# Patient Record
Sex: FEMALE | Race: WHITE | NOT HISPANIC OR LATINO | Employment: PART TIME | URBAN - METROPOLITAN AREA
[De-identification: names, ages, dates, MRNs, and addresses within clinical notes are randomized per-mention and may not be internally consistent; named-entity substitution may affect disease eponyms.]

---

## 2021-01-27 ENCOUNTER — HISTORICAL (OUTPATIENT)
Dept: ADMINISTRATIVE | Facility: HOSPITAL | Age: 31
End: 2021-01-27

## 2021-01-27 LAB — CORTIS SERPL-SCNC: 16.7 UG/DL

## 2022-04-10 ENCOUNTER — HISTORICAL (OUTPATIENT)
Dept: ADMINISTRATIVE | Facility: HOSPITAL | Age: 32
End: 2022-04-10
Payer: COMMERCIAL

## 2022-04-25 VITALS
BODY MASS INDEX: 23.43 KG/M2 | DIASTOLIC BLOOD PRESSURE: 82 MMHG | WEIGHT: 140.63 LBS | HEIGHT: 65 IN | SYSTOLIC BLOOD PRESSURE: 117 MMHG

## 2022-06-21 LAB
C TRACH RRNA SPEC QL PROBE: NEGATIVE
HBV SURFACE AG SERPL QL IA: NEGATIVE
HCV AB SERPL QL IA: NEGATIVE
HIV 1+2 AB+HIV1 P24 AG SERPL QL IA: NEGATIVE
N GONORRHOEAE, AMPLIFIED DNA: NEGATIVE
RUBELLA IMMUNE STATUS: NORMAL
T PALLIDUM AB SER QL: NONREACTIVE

## 2022-11-22 LAB — STREP B PCR (OHS): NEGATIVE

## 2022-12-12 ENCOUNTER — ANESTHESIA (OUTPATIENT)
Dept: OBSTETRICS AND GYNECOLOGY | Facility: HOSPITAL | Age: 32
End: 2022-12-12
Payer: COMMERCIAL

## 2022-12-12 ENCOUNTER — ANESTHESIA EVENT (OUTPATIENT)
Dept: OBSTETRICS AND GYNECOLOGY | Facility: HOSPITAL | Age: 32
End: 2022-12-12
Payer: COMMERCIAL

## 2022-12-12 ENCOUNTER — HOSPITAL ENCOUNTER (INPATIENT)
Facility: HOSPITAL | Age: 32
LOS: 2 days | Discharge: HOME | End: 2022-12-14
Attending: OBSTETRICS & GYNECOLOGY | Admitting: OBSTETRICS & GYNECOLOGY
Payer: COMMERCIAL

## 2022-12-12 DIAGNOSIS — Z3A.39 39 WEEKS GESTATION OF PREGNANCY: ICD-10-CM

## 2022-12-12 DIAGNOSIS — O61.9 FAILED INDUCTION OF LABOR: ICD-10-CM

## 2022-12-12 DIAGNOSIS — Z34.90 ENCOUNTER FOR ELECTIVE INDUCTION OF LABOR: Primary | ICD-10-CM

## 2022-12-12 LAB
BASOPHILS # BLD AUTO: 0.08 X10(3)/MCL (ref 0–0.2)
BASOPHILS NFR BLD AUTO: 0.5 %
EOSINOPHIL # BLD AUTO: 0.13 X10(3)/MCL (ref 0–0.9)
EOSINOPHIL NFR BLD AUTO: 0.9 %
ERYTHROCYTE [DISTWIDTH] IN BLOOD BY AUTOMATED COUNT: 13.5 % (ref 11.5–17)
GROUP & RH: NORMAL
HCT VFR BLD AUTO: 36.4 % (ref 37–47)
HGB BLD-MCNC: 11.9 GM/DL (ref 12–16)
IMM GRANULOCYTES # BLD AUTO: 0.35 X10(3)/MCL (ref 0–0.04)
IMM GRANULOCYTES NFR BLD AUTO: 2.3 %
INDIRECT COOMBS GEL: NORMAL
LYMPHOCYTES # BLD AUTO: 2.07 X10(3)/MCL (ref 0.6–4.6)
LYMPHOCYTES NFR BLD AUTO: 13.8 %
MCH RBC QN AUTO: 29.4 PG (ref 27–31)
MCHC RBC AUTO-ENTMCNC: 32.7 MG/DL (ref 33–36)
MCV RBC AUTO: 89.9 FL (ref 80–94)
MONOCYTES # BLD AUTO: 1.11 X10(3)/MCL (ref 0.1–1.3)
MONOCYTES NFR BLD AUTO: 7.4 %
NEUTROPHILS # BLD AUTO: 11.3 X10(3)/MCL (ref 2.1–9.2)
NEUTROPHILS NFR BLD AUTO: 75.1 %
NRBC BLD AUTO-RTO: 0 %
PLATELET # BLD AUTO: 250 X10(3)/MCL (ref 130–400)
PMV BLD AUTO: 11.2 FL (ref 7.4–10.4)
RBC # BLD AUTO: 4.05 X10(6)/MCL (ref 4.2–5.4)
T PALLIDUM AB SER QL: NONREACTIVE
WBC # SPEC AUTO: 15 X10(3)/MCL (ref 4.5–11.5)

## 2022-12-12 PROCEDURE — 25000003 PHARM REV CODE 250: Performed by: ANESTHESIOLOGY

## 2022-12-12 PROCEDURE — 37000009 HC ANESTHESIA EA ADD 15 MINS: Performed by: OBSTETRICS & GYNECOLOGY

## 2022-12-12 PROCEDURE — 85025 COMPLETE CBC W/AUTO DIFF WBC: CPT | Performed by: OBSTETRICS & GYNECOLOGY

## 2022-12-12 PROCEDURE — 37000008 HC ANESTHESIA 1ST 15 MINUTES: Performed by: OBSTETRICS & GYNECOLOGY

## 2022-12-12 PROCEDURE — 86901 BLOOD TYPING SEROLOGIC RH(D): CPT | Performed by: OBSTETRICS & GYNECOLOGY

## 2022-12-12 PROCEDURE — 27200918 HC ALEXIS O RING

## 2022-12-12 PROCEDURE — 72100002 HC LABOR CARE, 1ST 8 HOURS

## 2022-12-12 PROCEDURE — 72100003 HC LABOR CARE, EA. ADDL. 8 HRS

## 2022-12-12 PROCEDURE — 63600175 PHARM REV CODE 636 W HCPCS: Performed by: OBSTETRICS & GYNECOLOGY

## 2022-12-12 PROCEDURE — 63600175 PHARM REV CODE 636 W HCPCS: Performed by: NURSE ANESTHETIST, CERTIFIED REGISTERED

## 2022-12-12 PROCEDURE — 11000001 HC ACUTE MED/SURG PRIVATE ROOM

## 2022-12-12 PROCEDURE — 51702 INSERT TEMP BLADDER CATH: CPT

## 2022-12-12 PROCEDURE — 25000003 PHARM REV CODE 250: Performed by: NURSE ANESTHETIST, CERTIFIED REGISTERED

## 2022-12-12 PROCEDURE — 36004725 HC OB OR TIME LEV III - EA ADD 15 MIN: Performed by: OBSTETRICS & GYNECOLOGY

## 2022-12-12 PROCEDURE — 71000033 HC RECOVERY, INTIAL HOUR: Performed by: OBSTETRICS & GYNECOLOGY

## 2022-12-12 PROCEDURE — 36004724 HC OB OR TIME LEV III - 1ST 15 MIN: Performed by: OBSTETRICS & GYNECOLOGY

## 2022-12-12 PROCEDURE — 86780 TREPONEMA PALLIDUM: CPT | Performed by: OBSTETRICS & GYNECOLOGY

## 2022-12-12 PROCEDURE — 25000003 PHARM REV CODE 250: Performed by: OBSTETRICS & GYNECOLOGY

## 2022-12-12 PROCEDURE — 27201423 OPTIME MED/SURG SUP & DEVICES STERILE SUPPLY: Performed by: OBSTETRICS & GYNECOLOGY

## 2022-12-12 RX ORDER — MISOPROSTOL 100 MCG
25 TABLET ORAL ONCE
Status: COMPLETED | OUTPATIENT
Start: 2022-12-12 | End: 2022-12-12

## 2022-12-12 RX ORDER — BUTORPHANOL TARTRATE 2 MG/ML
1 INJECTION INTRAMUSCULAR; INTRAVENOUS
Status: DISCONTINUED | OUTPATIENT
Start: 2022-12-12 | End: 2022-12-14 | Stop reason: HOSPADM

## 2022-12-12 RX ORDER — LEVOTHYROXINE SODIUM 25 UG/1
25 TABLET ORAL
COMMUNITY
Start: 2022-11-12

## 2022-12-12 RX ORDER — TERBUTALINE SULFATE 1 MG/ML
0.25 INJECTION SUBCUTANEOUS
Status: DISCONTINUED | OUTPATIENT
Start: 2022-12-12 | End: 2022-12-14 | Stop reason: HOSPADM

## 2022-12-12 RX ORDER — CEFAZOLIN SODIUM 1 G/3ML
INJECTION, POWDER, FOR SOLUTION INTRAMUSCULAR; INTRAVENOUS
Status: DISCONTINUED | OUTPATIENT
Start: 2022-12-12 | End: 2022-12-12

## 2022-12-12 RX ORDER — OXYTOCIN/RINGER'S LACTATE 30/500 ML
334 PLASTIC BAG, INJECTION (ML) INTRAVENOUS ONCE
Status: DISCONTINUED | OUTPATIENT
Start: 2022-12-12 | End: 2022-12-14 | Stop reason: HOSPADM

## 2022-12-12 RX ORDER — METHYLERGONOVINE MALEATE 0.2 MG/ML
200 INJECTION INTRAVENOUS
Status: CANCELLED | OUTPATIENT
Start: 2022-12-12

## 2022-12-12 RX ORDER — OXYCODONE AND ACETAMINOPHEN 10; 325 MG/1; MG/1
1 TABLET ORAL EVERY 4 HOURS PRN
Status: DISCONTINUED | OUTPATIENT
Start: 2022-12-12 | End: 2022-12-14 | Stop reason: HOSPADM

## 2022-12-12 RX ORDER — MUPIROCIN 20 MG/G
OINTMENT TOPICAL 2 TIMES DAILY
Status: DISCONTINUED | OUTPATIENT
Start: 2022-12-12 | End: 2022-12-14 | Stop reason: HOSPADM

## 2022-12-12 RX ORDER — ONDANSETRON 2 MG/ML
INJECTION INTRAMUSCULAR; INTRAVENOUS
Status: DISCONTINUED | OUTPATIENT
Start: 2022-12-12 | End: 2022-12-12

## 2022-12-12 RX ORDER — DIPHENHYDRAMINE HCL 25 MG
25 CAPSULE ORAL EVERY 4 HOURS PRN
Status: DISCONTINUED | OUTPATIENT
Start: 2022-12-12 | End: 2022-12-14 | Stop reason: HOSPADM

## 2022-12-12 RX ORDER — SIMETHICONE 80 MG
1 TABLET,CHEWABLE ORAL EVERY 6 HOURS PRN
Status: DISCONTINUED | OUTPATIENT
Start: 2022-12-12 | End: 2022-12-14 | Stop reason: HOSPADM

## 2022-12-12 RX ORDER — ACETAMINOPHEN 325 MG/1
650 TABLET ORAL EVERY 6 HOURS PRN
Status: DISCONTINUED | OUTPATIENT
Start: 2022-12-12 | End: 2022-12-14 | Stop reason: HOSPADM

## 2022-12-12 RX ORDER — OXYTOCIN/RINGER'S LACTATE 30/500 ML
95 PLASTIC BAG, INJECTION (ML) INTRAVENOUS ONCE
Status: DISCONTINUED | OUTPATIENT
Start: 2022-12-12 | End: 2022-12-14 | Stop reason: HOSPADM

## 2022-12-12 RX ORDER — BUTORPHANOL TARTRATE 2 MG/ML
2 INJECTION INTRAMUSCULAR; INTRAVENOUS
Status: CANCELLED | OUTPATIENT
Start: 2022-12-12

## 2022-12-12 RX ORDER — OXYTOCIN/RINGER'S LACTATE 30/500 ML
0-30 PLASTIC BAG, INJECTION (ML) INTRAVENOUS CONTINUOUS
Status: DISCONTINUED | OUTPATIENT
Start: 2022-12-12 | End: 2022-12-14 | Stop reason: HOSPADM

## 2022-12-12 RX ORDER — KETOROLAC TROMETHAMINE 30 MG/ML
30 INJECTION, SOLUTION INTRAMUSCULAR; INTRAVENOUS EVERY 8 HOURS
Status: COMPLETED | OUTPATIENT
Start: 2022-12-12 | End: 2022-12-13

## 2022-12-12 RX ORDER — DEXTROSE, SODIUM CHLORIDE, SODIUM LACTATE, POTASSIUM CHLORIDE, AND CALCIUM CHLORIDE 5; .6; .31; .03; .02 G/100ML; G/100ML; G/100ML; G/100ML; G/100ML
INJECTION, SOLUTION INTRAVENOUS CONTINUOUS
Status: DISCONTINUED | OUTPATIENT
Start: 2022-12-12 | End: 2022-12-14 | Stop reason: HOSPADM

## 2022-12-12 RX ORDER — LIDOCAINE HYDROCHLORIDE 10 MG/ML
10 INJECTION INFILTRATION; PERINEURAL ONCE AS NEEDED
Status: DISCONTINUED | OUTPATIENT
Start: 2022-12-12 | End: 2022-12-14 | Stop reason: HOSPADM

## 2022-12-12 RX ORDER — SIMETHICONE 80 MG
1 TABLET,CHEWABLE ORAL 4 TIMES DAILY PRN
Status: DISCONTINUED | OUTPATIENT
Start: 2022-12-12 | End: 2022-12-12

## 2022-12-12 RX ORDER — ADHESIVE BANDAGE
30 BANDAGE TOPICAL 2 TIMES DAILY PRN
Status: DISCONTINUED | OUTPATIENT
Start: 2022-12-13 | End: 2022-12-14 | Stop reason: HOSPADM

## 2022-12-12 RX ORDER — ONDANSETRON 4 MG/1
8 TABLET, ORALLY DISINTEGRATING ORAL EVERY 8 HOURS PRN
Status: DISCONTINUED | OUTPATIENT
Start: 2022-12-12 | End: 2022-12-14 | Stop reason: HOSPADM

## 2022-12-12 RX ORDER — ACETAMINOPHEN 10 MG/ML
INJECTION, SOLUTION INTRAVENOUS
Status: DISCONTINUED | OUTPATIENT
Start: 2022-12-12 | End: 2022-12-12

## 2022-12-12 RX ORDER — OXYTOCIN 10 [USP'U]/ML
INJECTION, SOLUTION INTRAMUSCULAR; INTRAVENOUS
Status: DISCONTINUED | OUTPATIENT
Start: 2022-12-12 | End: 2022-12-12

## 2022-12-12 RX ORDER — MUPIROCIN 20 MG/G
OINTMENT TOPICAL 2 TIMES DAILY
Status: DISCONTINUED | OUTPATIENT
Start: 2022-12-12 | End: 2022-12-12

## 2022-12-12 RX ORDER — CARBOPROST TROMETHAMINE 250 UG/ML
250 INJECTION, SOLUTION INTRAMUSCULAR
Status: CANCELLED | OUTPATIENT
Start: 2022-12-12

## 2022-12-12 RX ORDER — MORPHINE SULFATE 0.5 MG/ML
INJECTION, SOLUTION EPIDURAL; INTRATHECAL; INTRAVENOUS
Status: DISCONTINUED | OUTPATIENT
Start: 2022-12-12 | End: 2022-12-12

## 2022-12-12 RX ORDER — FENTANYL CITRATE 50 UG/ML
INJECTION, SOLUTION INTRAMUSCULAR; INTRAVENOUS
Status: DISCONTINUED | OUTPATIENT
Start: 2022-12-12 | End: 2022-12-12

## 2022-12-12 RX ORDER — SODIUM CHLORIDE, SODIUM LACTATE, POTASSIUM CHLORIDE, CALCIUM CHLORIDE 600; 310; 30; 20 MG/100ML; MG/100ML; MG/100ML; MG/100ML
INJECTION, SOLUTION INTRAVENOUS CONTINUOUS
Status: DISCONTINUED | OUTPATIENT
Start: 2022-12-12 | End: 2022-12-14 | Stop reason: HOSPADM

## 2022-12-12 RX ORDER — PROCHLORPERAZINE EDISYLATE 5 MG/ML
5 INJECTION INTRAMUSCULAR; INTRAVENOUS EVERY 6 HOURS PRN
Status: DISCONTINUED | OUTPATIENT
Start: 2022-12-12 | End: 2022-12-14 | Stop reason: HOSPADM

## 2022-12-12 RX ORDER — CALCIUM CARBONATE 200(500)MG
500 TABLET,CHEWABLE ORAL 3 TIMES DAILY PRN
Status: DISCONTINUED | OUTPATIENT
Start: 2022-12-12 | End: 2022-12-14 | Stop reason: HOSPADM

## 2022-12-12 RX ORDER — AMOXICILLIN 250 MG
1 CAPSULE ORAL NIGHTLY PRN
Status: DISCONTINUED | OUTPATIENT
Start: 2022-12-12 | End: 2022-12-14 | Stop reason: HOSPADM

## 2022-12-12 RX ORDER — IBUPROFEN 800 MG/1
800 TABLET ORAL EVERY 8 HOURS
Status: DISCONTINUED | OUTPATIENT
Start: 2022-12-13 | End: 2022-12-14 | Stop reason: HOSPADM

## 2022-12-12 RX ORDER — BISACODYL 10 MG
10 SUPPOSITORY, RECTAL RECTAL ONCE AS NEEDED
Status: DISCONTINUED | OUTPATIENT
Start: 2022-12-12 | End: 2022-12-14 | Stop reason: HOSPADM

## 2022-12-12 RX ORDER — ONDANSETRON 2 MG/ML
4 INJECTION INTRAMUSCULAR; INTRAVENOUS EVERY 6 HOURS PRN
Status: DISCONTINUED | OUTPATIENT
Start: 2022-12-12 | End: 2022-12-14 | Stop reason: HOSPADM

## 2022-12-12 RX ORDER — OXYCODONE AND ACETAMINOPHEN 5; 325 MG/1; MG/1
1 TABLET ORAL EVERY 4 HOURS PRN
Status: DISCONTINUED | OUTPATIENT
Start: 2022-12-12 | End: 2022-12-14 | Stop reason: HOSPADM

## 2022-12-12 RX ORDER — OXYTOCIN 10 [USP'U]/ML
10 INJECTION, SOLUTION INTRAMUSCULAR; INTRAVENOUS
Status: DISCONTINUED | OUTPATIENT
Start: 2022-12-12 | End: 2022-12-14 | Stop reason: HOSPADM

## 2022-12-12 RX ORDER — DIPHENOXYLATE HYDROCHLORIDE AND ATROPINE SULFATE 2.5; .025 MG/1; MG/1
1 TABLET ORAL 4 TIMES DAILY PRN
Status: CANCELLED | OUTPATIENT
Start: 2022-12-12

## 2022-12-12 RX ORDER — BUPIVACAINE HYDROCHLORIDE 7.5 MG/ML
INJECTION, SOLUTION EPIDURAL; RETROBULBAR
Status: COMPLETED | OUTPATIENT
Start: 2022-12-12 | End: 2022-12-12

## 2022-12-12 RX ORDER — SODIUM CHLORIDE 0.9 % (FLUSH) 0.9 %
10 SYRINGE (ML) INJECTION
Status: DISCONTINUED | OUTPATIENT
Start: 2022-12-12 | End: 2022-12-14 | Stop reason: HOSPADM

## 2022-12-12 RX ORDER — MISOPROSTOL 100 UG/1
800 TABLET ORAL
Status: CANCELLED | OUTPATIENT
Start: 2022-12-12

## 2022-12-12 RX ORDER — KETOROLAC TROMETHAMINE 30 MG/ML
INJECTION, SOLUTION INTRAMUSCULAR; INTRAVENOUS
Status: DISCONTINUED | OUTPATIENT
Start: 2022-12-12 | End: 2022-12-12

## 2022-12-12 RX ORDER — PRENATAL WITH FERROUS FUM AND FOLIC ACID 3080; 920; 120; 400; 22; 1.84; 3; 20; 10; 1; 12; 200; 27; 25; 2 [IU]/1; [IU]/1; MG/1; [IU]/1; MG/1; MG/1; MG/1; MG/1; MG/1; MG/1; UG/1; MG/1; MG/1; MG/1; MG/1
1 TABLET ORAL DAILY
Status: DISCONTINUED | OUTPATIENT
Start: 2022-12-12 | End: 2022-12-14 | Stop reason: HOSPADM

## 2022-12-12 RX ORDER — DOCUSATE SODIUM 100 MG/1
200 CAPSULE, LIQUID FILLED ORAL 2 TIMES DAILY
Status: DISCONTINUED | OUTPATIENT
Start: 2022-12-12 | End: 2022-12-14 | Stop reason: HOSPADM

## 2022-12-12 RX ADMIN — MISOPROSTOL 25 MCG: 100 TABLET ORAL at 01:12

## 2022-12-12 RX ADMIN — OXYTOCIN 30 UNITS: 10 INJECTION, SOLUTION INTRAMUSCULAR; INTRAVENOUS at 01:12

## 2022-12-12 RX ADMIN — SODIUM CHLORIDE, SODIUM LACTATE, POTASSIUM CHLORIDE, CALCIUM CHLORIDE AND DEXTROSE MONOHYDRATE: 5; 600; 310; 30; 20 INJECTION, SOLUTION INTRAVENOUS at 07:12

## 2022-12-12 RX ADMIN — KETOROLAC TROMETHAMINE 30 MG: 30 INJECTION, SOLUTION INTRAMUSCULAR at 01:12

## 2022-12-12 RX ADMIN — CEFAZOLIN 2 G: 330 INJECTION, POWDER, FOR SOLUTION INTRAMUSCULAR; INTRAVENOUS at 12:12

## 2022-12-12 RX ADMIN — BUPIVACAINE HYDROCHLORIDE 1.8 ML: 7.5 INJECTION, SOLUTION EPIDURAL; RETROBULBAR at 12:12

## 2022-12-12 RX ADMIN — ONDANSETRON 4 MG: 2 INJECTION INTRAMUSCULAR; INTRAVENOUS at 12:12

## 2022-12-12 RX ADMIN — KETOROLAC TROMETHAMINE 30 MG: 30 INJECTION, SOLUTION INTRAMUSCULAR; INTRAVENOUS at 10:12

## 2022-12-12 RX ADMIN — FENTANYL CITRATE 10 MCG: 50 INJECTION, SOLUTION INTRAMUSCULAR; INTRAVENOUS at 12:12

## 2022-12-12 RX ADMIN — SODIUM CHLORIDE, SODIUM GLUCONATE, SODIUM ACETATE, POTASSIUM CHLORIDE AND MAGNESIUM CHLORIDE: 526; 502; 368; 37; 30 INJECTION, SOLUTION INTRAVENOUS at 12:12

## 2022-12-12 RX ADMIN — SODIUM CHLORIDE, SODIUM LACTATE, POTASSIUM CHLORIDE, CALCIUM CHLORIDE AND DEXTROSE MONOHYDRATE: 5; 600; 310; 30; 20 INJECTION, SOLUTION INTRAVENOUS at 12:12

## 2022-12-12 RX ADMIN — MORPHINE SULFATE 0.15 MG: 0.5 INJECTION, SOLUTION EPIDURAL; INTRATHECAL; INTRAVENOUS at 12:12

## 2022-12-12 RX ADMIN — ACETAMINOPHEN 1000 MG: 10 INJECTION, SOLUTION INTRAVENOUS at 01:12

## 2022-12-12 RX ADMIN — SODIUM CHLORIDE, SODIUM LACTATE, POTASSIUM CHLORIDE, CALCIUM CHLORIDE AND DEXTROSE MONOHYDRATE: 5; 600; 310; 30; 20 INJECTION, SOLUTION INTRAVENOUS at 05:12

## 2022-12-12 NOTE — ANESTHESIA PREPROCEDURE EVALUATION
Latest Reference Range & Units 22 00:33   WBC 4.5 - 11.5 x10(3)/mcL 15.0 (H)   RBC 4.20 - 5.40 x10(6)/mcL 4.05 (L)   HEMOGLOBIN 12.0 - 16.0 gm/dL 11.9 (L)   HEMATOCRIT 37.0 - 47.0 % 36.4 (L)   MCV 80.0 - 94.0 fL 89.9   MCH 27.0 - 31.0 pg 29.4   MCHC 33.0 - 36.0 mg/dL 32.7 (L)   RDW 11.5 - 17.0 % 13.5   Platelets 130 - 400 x10(3)/mcL 250   MPV 7.4 - 10.4 fL 11.2 (H)   Neut % % 75.1   LYMPH % % 13.8   Mono % % 7.4   Eosinophil % % 0.9   Basophil % % 0.5   Immature Granulocytes % 2.3   Neut # 2.1 - 9.2 x10(3)/mcL 11.3 (H)   Lymph # 0.6 - 4.6 x10(3)/mcL 2.07   Mono # 0.1 - 1.3 x10(3)/mcL 1.11   Eos # 0 - 0.9 x10(3)/mcL 0.13   Baso # 0 - 0.2 x10(3)/mcL 0.08   Immature Grans (Abs) 0 - 0.04 x10(3)/mcL 0.35 (H)   nRBC % 0.0   Group & Rh  A NEG   Indirect Vitaly GEL  NEG   Syphilis Antibody Nonreactive, Equivocal  Nonreactive   (H): Data is abnormally high  (L): Data is abnormally low                                                                                                     2022  Carla Dutta is a 32 y.o., female.    Pre-op Assessment    I have reviewed the Patient Summary Reports.     I have reviewed the Nursing Notes. I have reviewed the NPO Status.   I have reviewed the Medications.     Review of Systems  Anesthesia Hx:  No problems with previous Anesthesia    Hematology/Oncology:  Hematology Normal   Oncology Normal     EENT/Dental:EENT/Dental Normal   Cardiovascular:  Cardiovascular Normal Exercise tolerance: good   Functional Capacity good / => 4 METS    Pulmonary:  Pulmonary Normal    Renal/:   Denies Chronic Renal Disease.     Hepatic/GI:  Hepatic/GI Normal    Musculoskeletal:  Musculoskeletal Normal    Neurological:  Neurology Normal    Endocrine:  Endocrine Normal  Denies Morbid Obesity / BMI > 40  Dermatological:  Skin Normal    Psych:  Psychiatric Normal           Physical Exam  General: Alert, Oriented, Well nourished and Cooperative    Airway:  Mallampati: II   Mouth  Opening: Normal  TM Distance: Normal  Tongue: Normal  Neck ROM: Normal ROM    Dental:  Intact    Chest/Lungs:  Clear to auscultation, Normal Respiratory Rate    Heart:  Rate: Normal  Rhythm: Regular Rhythm        Anesthesia Plan  Type of Anesthesia, risks & benefits discussed:    Anesthesia Type: Spinal  Intra-op Monitoring Plan: Standard ASA Monitors  Post Op Pain Control Plan: multimodal analgesia  Induction:  IV  Airway Plan: Direct  Informed Consent: Informed consent signed with the Patient and all parties understand the risks and agree with anesthesia plan.  All questions answered. Patient consented to blood products? Yes  ASA Score: 2  Day of Surgery Review of History & Physical: H&P Update referred to the surgeon/provider.    Ready For Surgery From Anesthesia Perspective.     .

## 2022-12-12 NOTE — ANESTHESIA PROCEDURE NOTES
Spinal    Diagnosis: pregnancy requiring c section  Patient location during procedure: OR  Start time: 12/12/2022 12:56 PM  Timeout: 12/12/2022 12:55 PM  End time: 12/12/2022 12:57 PM    Staffing  Authorizing Provider: Keaton Lockhart MD  Performing Provider: Jong Juarez CRNA    Spinal Block  Patient position: sitting  Prep: ChloraPrep  Patient monitoring: heart rate, cardiac monitor, continuous pulse ox and frequent blood pressure checks  Approach: midline  Location: L3-4  Injection technique: single shot  CSF Fluid: clear free-flowing CSF  Needle  Needle type: Mariah   Needle gauge: 25 G  Needle length: 3.5 in  Additional Documentation: no paresthesia on injection  Needle localization: anatomical landmarks  Assessment  Sensory level: T4   Dermatomal levels determined by alcohol wipe  Patient's tolerance of the procedure: comfortable throughout block  Additional Notes  Duramorph 100 mcg + fentanyl 10 mcg sas  Medications:    Medications: bupivacaine (pf) (MARCAINE) injection 0.75% - Intraspinal   1.8 mL - 12/12/2022 12:56:00 PM

## 2022-12-12 NOTE — PLAN OF CARE
Problem: Adult Inpatient Plan of Care  Goal: Plan of Care Review  Outcome: Ongoing, Progressing  Goal: Patient-Specific Goal (Individualized)  Outcome: Ongoing, Progressing  Goal: Absence of Hospital-Acquired Illness or Injury  Outcome: Ongoing, Progressing  Goal: Optimal Comfort and Wellbeing  Outcome: Ongoing, Progressing  Goal: Readiness for Transition of Care  Outcome: Ongoing, Progressing     Problem:  Fall Injury Risk  Goal: Absence of Fall, Infant Drop and Related Injury  Outcome: Ongoing, Progressing     Problem: Infection  Goal: Absence of Infection Signs and Symptoms  Outcome: Ongoing, Progressing     Problem: Postoperative Nausea and Vomiting (Postpartum  Delivery)  Goal: Nausea and Vomiting Relief  Outcome: Ongoing, Progressing     Problem: Pain (Postpartum  Delivery)  Goal: Acceptable Pain Control  Outcome: Ongoing, Progressing     Problem: Infection (Postpartum  Delivery)  Goal: Absence of Infection Signs and Symptoms  Outcome: Ongoing, Progressing

## 2022-12-12 NOTE — L&D DELIVERY NOTE
[facility] - 2nd Floor Mother/Baby Unit   Section   Operative Note    SUMMARY     Date of Procedure: 2022     Procedure: Procedure(s) (LRB):   SECTION (N/A)    Surgeon(s) and Role:     * Robin Mackenzie MD - Primary     * Jose Armando Sharpe MD - Assisting        Pre-Operative Diagnosis: Failed induction of labor [O61.9]    Post-Operative Diagnosis: Post-Op Diagnosis Codes:     * Failed induction of labor [O61.9]    Anesthesia: Spinal/Epidural    Technical Procedures Used: LTCS           Description of the Findings of the Procedure: none    Significant Surgical Tasks Conducted by the Assistant(s), if Applicable: assited with opening, delivery of baby, closure of peritoneum    Complications: No    Blood Loss: * No values recorded between 2022 12:00 AM and 2022  2:56 PM *     With patient in supine position, the legs are  and Worthington Catheter placed and positioning to supine done.   Abdomen prepped with Chloroprep and 3 minute drying time allowed prior to draping of the abdomen.   Time out taken with OR team members.  Pfannenstiel Incision made through the skin, transverse fascial incision developed, rectus muscles  in the midline and the peritoneum entered.   no adhesions noted.  The lower uterine segment and position of the fetus identified.   Bladder flap taken down through transverse peritoneal incision.    Low Transverse Incision made through well developer lower uterine segment and extended laterally with blunt dissection.   Clear fluid noted.  Infant delivered from vertex presentation.  Cord clamped after one minute and  handed to attending nurse.  Cord blood taken, placenta delivered.  The uterus wasnot exteriorized.  The edges of the uterine incision are grasped with Ng clamps at the angles and the inferior and superior midline edges of the incision.    Closure with running lock 0 Chromic, starting at each angle, tying in the  "midline.   Observation for bleeding with suture of any bleeding along the hysterotomy line.   With good hemostasis noted, the anterior pelvis is rinsed with sterile saline.   Right and left adnexa with normal anatomy.     Closure of the abdomen with 2 0 Vicryl running of the peritoneum, fascial closure with 0 looped PDS starting at the right angle and tying the knot at the left angle.  Skin closure with 4 0 Vicryl subcuticular.  Wound dressed with telfa/tegaderm.          Specimens:   Specimen (24h ago, onward)      None            Condition: Good    Disposition: PACU - hemodynamically stable.    Attestation: Good         Delivery Information for Narendra Dutta    Birth information:  YOB: 2021   Time of birth: 1:12 PM   Sex: male   Head Delivery Date/Time: 2022  1:12 PM   Delivery type: , Low Transverse   Gestational Age: 39w0d    Delivery Providers    Delivering clinician: Robin Garcia MD           Measurements    Weight: 3629 g  Weight (lbs): 8 lb  Length: 52.1 cm  Length (in): 20.5"  Head circumference: 35.6 cm         Apgars    Living status: Living  Apgars:  1 min.:  5 min.:  10 min.:  15 min.:  20 min.:    Skin color:  0  1       Heart rate:  2  2       Reflex irritability:  2  2       Muscle tone:  2  2       Respiratory effort:  2  2       Total:  8  9       Apgars assigned by: ART BERGERON RN         Operative Delivery    Forceps attempted?: No  Vacuum extractor attempted?: Yes  Vacuum type: MityVac  First attempt time vacuum applied: 2022 13:12:00  First attempt time vacuum removed: 2022 13:12:00  Number of pop offs: 0  Number of pulls with vacuum: 1  Total vacuum application time: 15 seconds  Vacuum applied by: DR GARCIA  Failed vacuum delivery?: No         Shoulder Dystocia    Shoulder dystocia present?: No           Presentation    Presentation: Vertex           Interventions/Resuscitation    Method: Bulb Suctioning, Tactile Stimulation, Deep " Suctioning       Cord    Vessels: 3 vessels  Complications: None  Cord Blood Disposition: Sent with Baby       Placenta    Placenta delivery date/time: 2022 1313  Placenta removal: Manual removal  Placenta appearance: Intact  Placenta disposition: discarded           Labor Events:       labor: No     Labor Onset Date/Time: 2022 01:00     Dilation Complete Date/Time:         Start Pushing Date/Time:       Rupture Date/Time: 22  1311         Rupture type: ARM (Artificial Rupture)           Fluid Amount:         Fluid Color: Clear         steroids: None     Antibiotics given for GBS: No     Induction: oxytocin;other (see comments)     Indications for induction:  Elective     Augmentation:       Indications for augmentation:       Labor complications: None     Additional complications:          Cervical ripening:                     Delivery:      Episiotomy:       Indication for Episiotomy:       Perineal Lacerations:   Repaired:      Periurethral Laceration:   Repaired:     Labial Laceration:   Repaired:     Sulcus Laceration:   Repaired:     Vaginal Laceration:   Repaired:     Cervical Laceration:   Repaired:     Repair suture:       Repair # of packets:       Last Value - EBL - Nursing (mL):       Sum - EBL - Nursing (mL): 0     Last Value - EBL - Anesthesia (mL):        Calculated QBL (mL):         Vaginal Sweep Performed:       Surgicount Correct:         Other providers:       Anesthesia    Method: Spinal          Details (if applicable):  Trial of Labor Yes    Categorization: Primary    Priority: Routine   Indications for : Failed induction   Incision Type:       Additional  information:  Forceps:    Vacuum:    Breech:    Observed anomalies    Other (Comments): cytotec and pitocin induction. Failed to progress.     Vacuum applied by Doctor Mackenzie at 1312 for 15 seconds. 1 pull, 0 pop offs.

## 2022-12-12 NOTE — TRANSFER OF CARE
"Anesthesia Transfer of Care Note    Patient: Carla Tra    Procedure(s) Performed: * No procedures listed *    Patient location: Labor and Delivery    Anesthesia Type: general    Transport from OR: Transported from OR on room air with adequate spontaneous ventilation    Post pain: adequate analgesia    Post assessment: no apparent anesthetic complications and tolerated procedure well    Post vital signs: stable    Level of consciousness: awake, alert and oriented    Nausea/Vomiting: no nausea/vomiting    Complications: none    Transfer of care protocol was followed      Last vitals:   Visit Vitals  /76   Pulse 84   Temp 37 °C (98.6 °F)   Resp 18   Ht 5' 5" (1.651 m)   Wt 97.5 kg (215 lb)   SpO2 97%   Breastfeeding No   BMI 35.78 kg/m²     "

## 2022-12-12 NOTE — H&P
[facility] - Labor and Delivery  Obstetrics  History & Physical    Patient Name: Carla Dutta  MRN: [id number]  Admission Date: 2022  Primary Care Provider: Primary Doctor No    Subjective:     Principal Problem:<principal problem not specified>    History of Present Illness:  admitted for induction of labor. Received cytotec athen pitocin drip for induction of labor. No cervical change oer the last 12 hours.     Obstetric HPI:  Patient reports Frequency: Every 4 minutes contractions, active fetal movement, No vaginal bleeding , No loss of fluid     This pregnancy has been complicated by none    OB History    Para Term  AB Living   3 0 0 0 2 0   SAB IAB Ectopic Multiple Live Births   1 0 1 0 0      # Outcome Date GA Lbr Remy/2nd Weight Sex Delivery Anes PTL Lv   3 Current            2 Ectopic      ECTOPIC      1 SAB      SAB        Past Medical History:   Diagnosis Date    Hypothyroidism, unspecified      History reviewed. No pertinent surgical history.    PTA Medications   Medication Sig    levothyroxine (SYNTHROID) 25 MCG tablet Take 25 mcg by mouth.       Review of patient's allergies indicates:  No Known Allergies     Family History    None       Tobacco Use    Smoking status: Former     Types: Cigarettes     Quit date:      Years since quittin.9    Smokeless tobacco: Never   Substance and Sexual Activity    Alcohol use: Never    Drug use: Never    Sexual activity: Not on file     Review of Systems   Objective:     Vital Signs (Most Recent):  Temp: 98.6 °F (37 °C) (22 0845)  Pulse: 91 (22 1229)  Resp: 18 (22 0714)  BP: (!) 141/83 (22 1229)  SpO2: (!) 94 % (22 1113)   Vital Signs (24h Range):  Temp:  [97.9 °F (36.6 °C)-98.6 °F (37 °C)] 98.6 °F (37 °C)  Pulse:  [] 91  Resp:  [16-18] 18  SpO2:  [94 %-98 %] 94 %  BP: (111-144)/(58-83) 141/83     Weight: 97.5 kg (215 lb)  Body mass index is 35.78 kg/m².    FHT: 143Cat 0  (reassuring)  TOCO:  Q 4 minutes    Physical Exam    Cervix:  Dilation:  0  Effacement:  50%  Station: -2  Presentation: Vertex     Significant Labs:  Lab Results   Component Value Date    GROUPTRH A NEG 2022       I have personallly reviewed all pertinent lab results from the last 24 hours.    Assessment/Plan:     32 y.o. female  at 39w0d for:    Active Diagnoses:    Diagnosis Date Noted POA    39 weeks gestation of pregnancy [Z3A.39] 2022 Not Applicable    Failed induction of labor [O61.9] 2022 Unknown      Problems Resolved During this Admission:       Failed induction of labor.   Proceed with C/S delivery    Robin Mackenzie MD  Obstetrics  [facility] - Labor and Delivery

## 2022-12-13 LAB
BASOPHILS # BLD AUTO: 0.06 X10(3)/MCL (ref 0–0.2)
BASOPHILS NFR BLD AUTO: 0.5 %
EOSINOPHIL # BLD AUTO: 0.09 X10(3)/MCL (ref 0–0.9)
EOSINOPHIL NFR BLD AUTO: 0.7 %
ERYTHROCYTE [DISTWIDTH] IN BLOOD BY AUTOMATED COUNT: 13.8 % (ref 11.5–17)
HCT VFR BLD AUTO: 31.8 % (ref 37–47)
HGB BLD-MCNC: 10.4 GM/DL (ref 12–16)
IMM GRANULOCYTES # BLD AUTO: 0.22 X10(3)/MCL (ref 0–0.04)
IMM GRANULOCYTES NFR BLD AUTO: 1.7 %
LYMPHOCYTES # BLD AUTO: 1.56 X10(3)/MCL (ref 0.6–4.6)
LYMPHOCYTES NFR BLD AUTO: 11.7 %
MCH RBC QN AUTO: 29.5 PG (ref 27–31)
MCHC RBC AUTO-ENTMCNC: 32.7 MG/DL (ref 33–36)
MCV RBC AUTO: 90.3 FL (ref 80–94)
MONOCYTES # BLD AUTO: 1.12 X10(3)/MCL (ref 0.1–1.3)
MONOCYTES NFR BLD AUTO: 8.4 %
NEUTROPHILS # BLD AUTO: 10.3 X10(3)/MCL (ref 2.1–9.2)
NEUTROPHILS NFR BLD AUTO: 77 %
NRBC BLD AUTO-RTO: 0 %
PLATELET # BLD AUTO: 227 X10(3)/MCL (ref 130–400)
PMV BLD AUTO: 11 FL (ref 7.4–10.4)
RBC # BLD AUTO: 3.52 X10(6)/MCL (ref 4.2–5.4)
WBC # SPEC AUTO: 13.3 X10(3)/MCL (ref 4.5–11.5)

## 2022-12-13 PROCEDURE — 36415 COLL VENOUS BLD VENIPUNCTURE: CPT | Performed by: OBSTETRICS & GYNECOLOGY

## 2022-12-13 PROCEDURE — 25000003 PHARM REV CODE 250: Performed by: OBSTETRICS & GYNECOLOGY

## 2022-12-13 PROCEDURE — 11000001 HC ACUTE MED/SURG PRIVATE ROOM

## 2022-12-13 PROCEDURE — 85025 COMPLETE CBC W/AUTO DIFF WBC: CPT | Performed by: OBSTETRICS & GYNECOLOGY

## 2022-12-13 PROCEDURE — 63600175 PHARM REV CODE 636 W HCPCS: Performed by: OBSTETRICS & GYNECOLOGY

## 2022-12-13 RX ADMIN — KETOROLAC TROMETHAMINE 30 MG: 30 INJECTION, SOLUTION INTRAMUSCULAR; INTRAVENOUS at 06:12

## 2022-12-13 RX ADMIN — DOCUSATE SODIUM 200 MG: 100 CAPSULE, LIQUID FILLED ORAL at 10:12

## 2022-12-13 RX ADMIN — KETOROLAC TROMETHAMINE 30 MG: 30 INJECTION, SOLUTION INTRAMUSCULAR; INTRAVENOUS at 02:12

## 2022-12-13 RX ADMIN — DOCUSATE SODIUM 200 MG: 100 CAPSULE, LIQUID FILLED ORAL at 09:12

## 2022-12-13 RX ADMIN — IBUPROFEN 800 MG: 800 TABLET, FILM COATED ORAL at 09:12

## 2022-12-13 RX ADMIN — OXYCODONE HYDROCHLORIDE AND ACETAMINOPHEN 1 TABLET: 5; 325 TABLET ORAL at 10:12

## 2022-12-13 RX ADMIN — OXYCODONE HYDROCHLORIDE AND ACETAMINOPHEN 1 TABLET: 5; 325 TABLET ORAL at 07:12

## 2022-12-13 RX ADMIN — PRENATAL VITAMINS-IRON FUMARATE 27 MG IRON-FOLIC ACID 0.8 MG TABLET 1 TABLET: at 10:12

## 2022-12-13 RX ADMIN — OXYCODONE HYDROCHLORIDE AND ACETAMINOPHEN 1 TABLET: 5; 325 TABLET ORAL at 02:12

## 2022-12-13 NOTE — ANESTHESIA POSTPROCEDURE EVALUATION
Anesthesia Post Evaluation    Patient: Carla Tra    Procedure(s) Performed: * No procedures listed *    Final Anesthesia Type: spinal      Patient location during evaluation: floor  Patient participation: Yes- Able to Participate  Level of consciousness: awake and alert  Post-procedure vital signs: reviewed and stable  Pain management: adequate  Airway patency: patent    PONV status at discharge: No PONV  Anesthetic complications: no      Respiratory status: unassisted  Hydration status: euvolemic  Follow-up not needed.          Vitals Value Taken Time   /73 12/13/22 0801   Temp 36.5 °C (97.7 °F) 12/13/22 0801   Pulse 73 12/13/22 0801   Resp 18 12/13/22 1017   SpO2 94 % 12/12/22 1425         No case tracking events are documented in the log.      Pain/Shirin Score: Pain Rating Prior to Med Admin: 3 (12/13/2022  2:29 PM)  Shirin Score: 9 (12/12/2022  2:40 PM)

## 2022-12-13 NOTE — PLAN OF CARE
Problem: Adult Inpatient Plan of Care  Goal: Plan of Care Review  Outcome: Ongoing, Progressing  Goal: Patient-Specific Goal (Individualized)  Outcome: Ongoing, Progressing  Goal: Absence of Hospital-Acquired Illness or Injury  Outcome: Ongoing, Progressing  Goal: Optimal Comfort and Wellbeing  Outcome: Ongoing, Progressing  Goal: Readiness for Transition of Care  Outcome: Ongoing, Progressing     Problem:  Fall Injury Risk  Goal: Absence of Fall, Infant Drop and Related Injury  Outcome: Ongoing, Progressing     Problem: Infection  Goal: Absence of Infection Signs and Symptoms  Outcome: Ongoing, Progressing     Problem: Adjustment to Role Transition (Postpartum  Delivery)  Goal: Successful Maternal Role Transition  Outcome: Ongoing, Progressing     Problem: Bleeding (Postpartum  Delivery)  Goal: Hemostasis  Outcome: Ongoing, Progressing     Problem: Infection (Postpartum  Delivery)  Goal: Absence of Infection Signs and Symptoms  Outcome: Ongoing, Progressing     Problem: Pain (Postpartum  Delivery)  Goal: Acceptable Pain Control  Outcome: Ongoing, Progressing     Problem: Postoperative Nausea and Vomiting (Postpartum  Delivery)  Goal: Nausea and Vomiting Relief  Outcome: Ongoing, Progressing

## 2022-12-13 NOTE — PROGRESS NOTES
POD #1    VSS afebrile    Breast= soft non tender  Fundus= firm, non tender, lochia moderate  Incision dry and intact, non indurated  Extremities- normal    Impression- POD stable     Plan cont post care

## 2022-12-14 VITALS
HEIGHT: 65 IN | WEIGHT: 215 LBS | TEMPERATURE: 98 F | SYSTOLIC BLOOD PRESSURE: 114 MMHG | RESPIRATION RATE: 18 BRPM | BODY MASS INDEX: 35.82 KG/M2 | OXYGEN SATURATION: 95 % | HEART RATE: 83 BPM | DIASTOLIC BLOOD PRESSURE: 77 MMHG

## 2022-12-14 PROCEDURE — 25000003 PHARM REV CODE 250: Performed by: OBSTETRICS & GYNECOLOGY

## 2022-12-14 RX ORDER — OXYCODONE AND ACETAMINOPHEN 5; 325 MG/1; MG/1
1 TABLET ORAL EVERY 4 HOURS PRN
Qty: 25 TABLET | Refills: 0 | Status: SHIPPED | OUTPATIENT
Start: 2022-12-14

## 2022-12-14 RX ORDER — IBUPROFEN 800 MG/1
800 TABLET ORAL EVERY 6 HOURS PRN
Qty: 60 TABLET | Refills: 2 | Status: SHIPPED | OUTPATIENT
Start: 2022-12-14

## 2022-12-14 RX ADMIN — OXYCODONE AND ACETAMINOPHEN 1 TABLET: 10; 325 TABLET ORAL at 03:12

## 2022-12-14 RX ADMIN — DOCUSATE SODIUM 200 MG: 100 CAPSULE, LIQUID FILLED ORAL at 12:12

## 2022-12-14 RX ADMIN — PRENATAL VITAMINS-IRON FUMARATE 27 MG IRON-FOLIC ACID 0.8 MG TABLET 1 TABLET: at 12:12

## 2022-12-14 RX ADMIN — OXYCODONE AND ACETAMINOPHEN 1 TABLET: 10; 325 TABLET ORAL at 12:12

## 2022-12-14 RX ADMIN — IBUPROFEN 800 MG: 800 TABLET, FILM COATED ORAL at 06:12

## 2022-12-14 NOTE — DISCHARGE SUMMARY
[facility] - 2nd Floor Mother/Baby Unit  Obstetrics  Discharge Summary      Patient Name: Carla Dutta  MRN: [id number]  Admission Date: 2022  Hospital Length of Stay: 2 days  Discharge Date and Time:  2022 7:08 AM  Attending Physician: Hyacinth Mackenzie MD   Discharging Provider: Hyacinth Mackenzie MD  Primary Care Provider: Primary Doctor No    HPI: admitted for induction of labor    Procedure(s) (LRB):   SECTION (N/A)     Hospital Course: failure to progress, c/s delivery performed    Consults (From admission, onward)          Status Ordering Provider     Inpatient consult to Anesthesiology  Once        Provider:  (Not yet assigned)    HYACINTH Smith            Final Active Diagnoses:    Diagnosis Date Noted POA    PRINCIPAL PROBLEM:  39 weeks gestation of pregnancy [Z3A.39] 2022 Not Applicable    Failed induction of labor [O61.9] 2022 Unknown    Delivery by elective  section [O82] 2022 No      Problems Resolved During this Admission:        Labs: CBC   Recent Labs   Lab 22  0409   WBC 13.3*   HGB 10.4*   HCT 31.8*          Feeding Method: breast    Immunizations       Date Immunization Status Dose Route/Site Given by    21 0000 COVID-19 Vaccine Given 0.5 mL Intramuscular/Left arm     21 0000 COVID-19 Vaccine Given 0.5 mL Intramuscular/Left arm     10/20/22 0000 Influenza - Quadrivalent - MDCK - PF Given 0.5 mL Intramuscular/Left arm     20 0000 Influenza - Quadrivalent - PF *Preferred* (6 months and older) Given 0.5 mL Intramuscular/Left deltoid     10/20/22 0000 Tdap Given 0.5 mL Intramuscular/Left arm     22 1420 Rho (D) Immune Globulin - IM Incomplete 300 mcg Intramuscular/     22 1420 Rho (D) Immune Globulin - IM Incomplete 300 mcg Intramuscular/     22 1520 MMR Incomplete 0.5 mL Subcutaneous/     22 1520 Tdap Incomplete 0.5 mL Intramuscular/             Delivery:    Episiotomy:      Lacerations:     Repair suture:     Repair # of packets:     Blood loss (ml):       Birth information:  YOB: 2021   Time of birth: 1:12 PM   Sex: male   Delivery type: , Low Transverse   Gestational Age: 39w0d    Delivery Clinician:      Other providers:       Additional  information:  Forceps:    Vacuum:    Breech:    Observed anomalies      Living?:           APGARS  One minute Five minutes Ten minutes   Skin color:         Heart rate:         Grimace:         Muscle tone:         Breathing:         Totals: 8  9        Placenta: Delivered:       appearance  Pending Diagnostic Studies:       None            Discharged Condition: good    Disposition:     Follow Up:    Patient Instructions:   No discharge procedures on file.  Medications:  Current Discharge Medication List        CONTINUE these medications which have NOT CHANGED    Details   levothyroxine (SYNTHROID) 25 MCG tablet Take 25 mcg by mouth.             Robin Mackenzie MD  Obstetrics  [facility] - 2nd Floor Mother/Baby Unit

## 2022-12-14 NOTE — PLAN OF CARE
"  Problem: Adult Inpatient Plan of Care  Goal: Plan of Care Review  Outcome: Ongoing, Progressing  Goal: Patient-Specific Goal (Individualized)  Outcome: Ongoing, Progressing  Flowsheets (Taken 2022)  Patient-Specific Goals (Include Timeframe): " I want to bottle feed"  Goal: Absence of Hospital-Acquired Illness or Injury  Outcome: Ongoing, Progressing  Goal: Optimal Comfort and Wellbeing  Outcome: Ongoing, Progressing  Goal: Readiness for Transition of Care  Outcome: Ongoing, Progressing     Problem:  Fall Injury Risk  Goal: Absence of Fall, Infant Drop and Related Injury  Outcome: Ongoing, Progressing     Problem: Infection  Goal: Absence of Infection Signs and Symptoms  Outcome: Ongoing, Progressing     Problem: Adjustment to Role Transition (Postpartum  Delivery)  Goal: Successful Maternal Role Transition  Outcome: Ongoing, Progressing     Problem: Bleeding (Postpartum  Delivery)  Goal: Hemostasis  Outcome: Ongoing, Progressing     Problem: Infection (Postpartum  Delivery)  Goal: Absence of Infection Signs and Symptoms  Outcome: Ongoing, Progressing     Problem: Pain (Postpartum  Delivery)  Goal: Acceptable Pain Control  Outcome: Ongoing, Progressing     Problem: Postoperative Nausea and Vomiting (Postpartum  Delivery)  Goal: Nausea and Vomiting Relief  Outcome: Ongoing, Progressing     Problem: Postoperative Urinary Retention (Postpartum  Delivery)  Goal: Effective Urinary Elimination  Outcome: Ongoing, Progressing     "

## 2023-11-20 PROBLEM — Z3A.39 39 WEEKS GESTATION OF PREGNANCY: Status: RESOLVED | Noted: 2022-12-12 | Resolved: 2023-11-20

## (undated) DEVICE — DRSNG TELFA ADH ISLAND 4X4IN

## (undated) DEVICE — SUT 2-0 VICRYL / CT-1

## (undated) DEVICE — SEE MEDLINE ITEM 156931

## (undated) DEVICE — PAD SANITARY OB STERILE

## (undated) DEVICE — CAP BABY BEANIE

## (undated) DEVICE — SOL NACL IRR 1000ML BTL

## (undated) DEVICE — SEE MEDLINE ITEM 157117

## (undated) DEVICE — SUT 0 36IN PDS II VIO MONO

## (undated) DEVICE — PAD UNDERPAD 30X30

## (undated) DEVICE — BINDER ABDOM 4PANEL 12IN LG/XL

## (undated) DEVICE — CUP TENDERTOUCH SIL 60MM

## (undated) DEVICE — SUT MONOCRYL 4-0 PS-1 UND

## (undated) DEVICE — BULB SYRINGE EAR IRRIGATION

## (undated) DEVICE — Device

## (undated) DEVICE — DRESSING TEGADERM 4.4X5IN

## (undated) DEVICE — SUT 3/0 36IN COATED VICRYL

## (undated) DEVICE — SUT CHROMIC GUT 2-0 CT-1 27IN

## (undated) DEVICE — ELECTRODE REM PLYHSV RETURN 9

## (undated) DEVICE — SOL WATER STRL IRR 1000ML